# Patient Record
Sex: FEMALE | Race: WHITE | NOT HISPANIC OR LATINO | Employment: OTHER | URBAN - METROPOLITAN AREA
[De-identification: names, ages, dates, MRNs, and addresses within clinical notes are randomized per-mention and may not be internally consistent; named-entity substitution may affect disease eponyms.]

---

## 2017-01-06 ENCOUNTER — IMPORTED ENCOUNTER (OUTPATIENT)
Dept: URBAN - METROPOLITAN AREA CLINIC 43 | Facility: CLINIC | Age: 77
End: 2017-01-06

## 2017-01-06 PROBLEM — H40.51X3: Noted: 2017-01-06

## 2017-01-27 ENCOUNTER — IMPORTED ENCOUNTER (OUTPATIENT)
Dept: URBAN - METROPOLITAN AREA CLINIC 43 | Facility: CLINIC | Age: 77
End: 2017-01-27

## 2017-03-22 ENCOUNTER — IMPORTED ENCOUNTER (OUTPATIENT)
Dept: URBAN - METROPOLITAN AREA CLINIC 43 | Facility: CLINIC | Age: 77
End: 2017-03-22

## 2017-03-22 PROBLEM — H40.51X3: Noted: 2017-03-22

## 2017-04-12 ENCOUNTER — IMPORTED ENCOUNTER (OUTPATIENT)
Dept: URBAN - METROPOLITAN AREA CLINIC 43 | Facility: CLINIC | Age: 77
End: 2017-04-12

## 2017-04-12 PROBLEM — H40.51X3: Noted: 2017-04-12

## 2017-05-24 ENCOUNTER — IMPORTED ENCOUNTER (OUTPATIENT)
Dept: URBAN - METROPOLITAN AREA CLINIC 43 | Facility: CLINIC | Age: 77
End: 2017-05-24

## 2017-05-24 PROBLEM — H40.51X3: Noted: 2017-05-24

## 2017-11-27 NOTE — PATIENT DISCUSSION
MYOPIA (progressive high) OU__: PRESCRIBED GLASSES AND OR CONTACTS. DISCUSSED COULD GET A RX FOR DISTANCE THAT WOULD GO OVER MONO VISION CONTACTS. FOLLOW-UP AS SCHEDULED.

## 2018-01-08 NOTE — PATIENT DISCUSSION
Dermatitis Counseling: I have talked with the patient about avoiding eyelid rubbing as this causes and exacerbates this condition. Patient advised to call if symptoms worsen and keep follow-up as scheduled.

## 2018-01-18 NOTE — PATIENT DISCUSSION
DERMATITIS OU:  PRESCRIBED TOBRADEX GRANT TO LIDS BID OU FOR ONE WEEK QHS OU FOR ONE WEEK THEN DISCONTINUE. APPLY TO AFFECTED AREA TWICE DAILY.

## 2018-02-02 ENCOUNTER — IMPORTED ENCOUNTER (OUTPATIENT)
Dept: URBAN - METROPOLITAN AREA CLINIC 43 | Facility: CLINIC | Age: 78
End: 2018-02-02

## 2018-02-02 PROBLEM — H57.11: Noted: 2018-02-02

## 2018-02-02 PROBLEM — H40.51X3: Noted: 2018-02-02

## 2018-02-09 NOTE — PATIENT DISCUSSION
PT HAS HAD MULTIPLE BOUTS OF CONTACT DERMATITIS. WE HAVE USED TOBRADEX GRANT TO AFFECTED AREA WITH SUCCESS. 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

## 2018-02-09 NOTE — PATIENT DISCUSSION
New Prescription: erythromycin (erythromycin): ointment: 5 mg/gram (0.5 %) a small amount twice a day on eyelid 02-

## 2018-04-30 ENCOUNTER — IMPORTED ENCOUNTER (OUTPATIENT)
Dept: URBAN - METROPOLITAN AREA CLINIC 43 | Facility: CLINIC | Age: 78
End: 2018-04-30

## 2018-04-30 PROBLEM — H40.51X3: Noted: 2018-04-30

## 2018-04-30 PROBLEM — H57.11: Noted: 2018-04-30

## 2018-06-04 ENCOUNTER — IMPORTED ENCOUNTER (OUTPATIENT)
Dept: URBAN - METROPOLITAN AREA CLINIC 43 | Facility: CLINIC | Age: 78
End: 2018-06-04

## 2018-06-19 ENCOUNTER — IMPORTED ENCOUNTER (OUTPATIENT)
Dept: URBAN - METROPOLITAN AREA CLINIC 43 | Facility: CLINIC | Age: 78
End: 2018-06-19

## 2018-06-19 PROBLEM — H16.011: Noted: 2018-06-19

## 2018-11-05 ENCOUNTER — IMPORTED ENCOUNTER (OUTPATIENT)
Dept: URBAN - METROPOLITAN AREA CLINIC 43 | Facility: CLINIC | Age: 78
End: 2018-11-05

## 2018-12-12 ENCOUNTER — IMPORTED ENCOUNTER (OUTPATIENT)
Dept: URBAN - METROPOLITAN AREA CLINIC 43 | Facility: CLINIC | Age: 78
End: 2018-12-12

## 2019-01-25 ENCOUNTER — IMPORTED ENCOUNTER (OUTPATIENT)
Dept: URBAN - METROPOLITAN AREA CLINIC 43 | Facility: CLINIC | Age: 79
End: 2019-01-25

## 2019-05-02 ENCOUNTER — IMPORTED ENCOUNTER (OUTPATIENT)
Dept: URBAN - METROPOLITAN AREA CLINIC 43 | Facility: CLINIC | Age: 79
End: 2019-05-02

## 2019-06-24 ENCOUNTER — IMPORTED ENCOUNTER (OUTPATIENT)
Dept: URBAN - METROPOLITAN AREA CLINIC 43 | Facility: CLINIC | Age: 79
End: 2019-06-24

## 2019-07-03 ENCOUNTER — IMPORTED ENCOUNTER (OUTPATIENT)
Dept: URBAN - METROPOLITAN AREA CLINIC 43 | Facility: CLINIC | Age: 79
End: 2019-07-03

## 2019-07-03 PROBLEM — H35.362: Noted: 2019-07-03

## 2019-07-03 PROBLEM — H40.51X3: Noted: 2019-07-03

## 2019-07-03 PROBLEM — H54.42A4: Noted: 2019-07-03

## 2019-07-03 PROBLEM — H43.812: Noted: 2019-07-03

## 2019-09-16 ENCOUNTER — IMPORTED ENCOUNTER (OUTPATIENT)
Dept: URBAN - METROPOLITAN AREA CLINIC 43 | Facility: CLINIC | Age: 79
End: 2019-09-16

## 2019-09-16 ENCOUNTER — PREPPED CHART (OUTPATIENT)
Dept: URBAN - METROPOLITAN AREA CLINIC 32 | Facility: CLINIC | Age: 79
End: 2019-09-16

## 2019-09-16 PROBLEM — H54.42A4: Noted: 2019-09-16

## 2019-09-16 PROBLEM — H57.11: Noted: 2019-09-16

## 2019-09-16 PROBLEM — H44.521: Noted: 2019-09-16

## 2019-09-20 ENCOUNTER — IMPORTED ENCOUNTER (OUTPATIENT)
Dept: URBAN - METROPOLITAN AREA CLINIC 43 | Facility: CLINIC | Age: 79
End: 2019-09-20

## 2019-11-04 ENCOUNTER — IMPORTED ENCOUNTER (OUTPATIENT)
Dept: URBAN - METROPOLITAN AREA CLINIC 43 | Facility: CLINIC | Age: 79
End: 2019-11-04

## 2019-11-04 NOTE — PATIENT DISCUSSION
Continue: erythromycin (erythromycin): ointment: 5 mg/gram (0.5 %) a small amount twice a day on eyelid 02-

## 2020-01-13 NOTE — PATIENT DISCUSSION
New Prescription: Lotemax (loteprednol etabonate): ointment: 0.5% a small amount at bedtime into affected eye 01-

## 2020-01-13 NOTE — PATIENT DISCUSSION
Stopped Today: Lotemax SM (loteprednol etabonate): drops,gel: 0.38% 1 drop three times a day into affected eye 12-

## 2020-01-29 NOTE — PATIENT DISCUSSION
Continue: Lotemax (loteprednol etabonate): ointment: 0.5% a small amount at bedtime into affected eye 01-

## 2020-04-19 ASSESSMENT — TONOMETRY
OD_IOP_MMHG: 10.0
OS_IOP_MMHG: 11.0
OS_IOP_MMHG: 10.0
OS_IOP_MMHG: 11.0
OS_IOP_MMHG: 12.0
OD_IOP_MMHG: 23.0
OS_IOP_MMHG: 12.0
OD_IOP_MMHG: 21.0
OS_IOP_MMHG: 12.0
OD_IOP_MMHG: 14.0
OS_IOP_MMHG: 12.0
OD_IOP_MMHG: 22.0
OD_IOP_MMHG: 8.0
OD_IOP_MMHG: 12.0
OD_IOP_MMHG: 14.0
OS_IOP_MMHG: 11.0
OD_IOP_MMHG: 19.0
OS_IOP_MMHG: 11.0

## 2020-04-19 ASSESSMENT — VISUAL ACUITY
OS_SC: 20/20 -2
OS_CC: J1+
OS_SC: 20/20
OS_OTHER: 20/80.
OS_SC: 20/20 -2
OD_SC: 20/20 -2
OS_SC: 20/20 -2
OS_SC: 20/25
OS_SC: 20/20
OS_CC: J1+
OS_SC: J10
OD_SC: 20/20
OS_SC: 20/20
OD_CC: J1+
OS_SC: 20/20

## 2020-04-19 ASSESSMENT — KERATOMETRY
OS_AXISANGLE_DEGREES: 165
OS_AXISANGLE2_DEGREES: 90
OS_AXISANGLE2_DEGREES: 75
OS_K1POWER_DIOPTERS: 44.75
OS_K1POWER_DIOPTERS: 45.5
OS_K2POWER_DIOPTERS: 45
OS_AXISANGLE_DEGREES: 180
OS_K2POWER_DIOPTERS: 44.75

## 2020-07-13 ENCOUNTER — ESTABLISHED COMPREHENSIVE EXAM (OUTPATIENT)
Dept: URBAN - METROPOLITAN AREA CLINIC 32 | Facility: CLINIC | Age: 80
End: 2020-07-13

## 2020-07-13 DIAGNOSIS — H40.9: ICD-10-CM

## 2020-07-13 PROCEDURE — 92133 CPTRZD OPH DX IMG PST SGM ON: CPT

## 2020-07-13 PROCEDURE — 92014 COMPRE OPH EXAM EST PT 1/>: CPT

## 2020-07-13 ASSESSMENT — TONOMETRY
OS_IOP_MMHG: 10
OD_IOP_MMHG: 16

## 2020-07-13 ASSESSMENT — VISUAL ACUITY: OS_SC: 20/20

## 2020-08-18 NOTE — PATIENT DISCUSSION
New Prescription: Lotemax SM (loteprednol etabonate): drops,gel: 0.38% 1 drop three times a day into both eyes 08-

## 2020-08-18 NOTE — PATIENT DISCUSSION
Stopped Today: Lotemax (loteprednol etabonate): ointment: 0.5% a small amount at bedtime into affected eye 01-

## 2020-09-01 NOTE — PATIENT DISCUSSION
Continue: Lotemax SM (loteprednol etabonate): drops,gel: 0.38% 1 drop three times a day into both eyes 08-

## 2020-09-01 NOTE — PATIENT DISCUSSION
DRY EYE WITH INFLAMMATION:  PRESERVATIVE FREE ARTIFICIAL TEARS Q2 HOURS, LID HYGIENE. PRESCRIBED LOTEMAX QID. CONTINUE RESTASIS  AND WILL CONSIDER PUNCTAL PLUGS AND/OR LIPIFLOW AT FOLLOW. RETURN SOONER IF SYMPTOMS WORSEN.

## 2020-09-15 NOTE — PATIENT DISCUSSION
Stopped Today: erythromycin (erythromycin): ointment: 5 mg/gram (0.5 %) a small amount twice a day on eyelid 02-

## 2020-09-15 NOTE — PATIENT DISCUSSION
New Prescription: Soraya Arechiga (lifitegrast): dropperette: 5% 1 drop twice a day into both eyes 09-

## 2020-09-15 NOTE — PATIENT DISCUSSION
DRY EYE WITH INFLAMMATION: PRESERVATIVE FREE ARTIFICIAL TEARS Q2 HOURS, LID HYGIENE. PRESCRIBED LOTEMAX BID. CONTINUE RESTASIS AND WILL CONSIDER PUNCTAL PLUGS AND/OR LIPIFLOW AT FOLLOW. RETURN SOONER IF SYMPTOMS WORSEN.

## 2020-09-30 NOTE — PATIENT DISCUSSION
CONTINUE REGIMEN WITH NO CL FOR ONE MORE WEEK. THEN, OK TO RE-INTRODUCE CL BUT CONTINUE REGIMEN OF GTTS. INSTRUCTED TO GRADUALLY BUILD UP WEAR TIME.

## 2020-09-30 NOTE — PATIENT DISCUSSION
New Prescription: Lotemax SM (loteprednol etabonate): drops,gel: 0.38% 1 drop twice a day into both eyes 09-

## 2020-09-30 NOTE — PATIENT DISCUSSION
Stopped Today: Lotemax SM (loteprednol etabonate): drops,gel: 0.38% 1 drop three times a day into both eyes 08-

## 2020-09-30 NOTE — PATIENT DISCUSSION
DRY EYE WITH INFLAMMATION:  PRESERVATIVE FREE ARTIFICIAL TEARS Q2 HOURS, LID HYGIENE. CONTINUE  LOTEMAX BID AND XIIDRA BID. CONSIDER PUNCTAL PLUGS AND/OR LIPIFLOW AT FOLLOW. RETURN SOONER IF SYMPTOMS WORSEN.

## 2020-10-20 NOTE — PATIENT DISCUSSION
Continue: Lotemax SM (loteprednol etabonate): drops,gel: 0.38% 1 drop twice a day into both eyes 09-

## 2020-11-03 NOTE — PATIENT DISCUSSION
CATARACTS, OU - NOT VISUALLY SIGNIFICANT. DISP MONO VISION CL TRIALS TODAY. PATIENT DOES NOT LIKE PROCLEAR BRAND FIT BUT LIKES NEW POWER CHANGE AND VISION. WILL TRY SALAMANCA AT HOME AND RTO IN 2 WEEKS FOR CL CHECK.

## 2021-06-30 ENCOUNTER — EST. PATIENT EMERGENCY (OUTPATIENT)
Dept: URBAN - METROPOLITAN AREA CLINIC 32 | Facility: CLINIC | Age: 81
End: 2021-06-30

## 2021-06-30 DIAGNOSIS — H44.521: ICD-10-CM

## 2021-06-30 DIAGNOSIS — H57.11: ICD-10-CM

## 2021-06-30 DIAGNOSIS — H04.123: ICD-10-CM

## 2021-06-30 PROCEDURE — 92012 INTRM OPH EXAM EST PATIENT: CPT

## 2021-06-30 ASSESSMENT — TONOMETRY
OS_IOP_MMHG: 12
OD_IOP_MMHG: 26

## 2021-06-30 ASSESSMENT — VISUAL ACUITY: OS_SC: 20/20

## 2021-08-04 ENCOUNTER — VISUAL FIELD FOLLOW-UP (OUTPATIENT)
Dept: URBAN - METROPOLITAN AREA CLINIC 32 | Facility: CLINIC | Age: 81
End: 2021-08-04

## 2021-08-04 DIAGNOSIS — H04.123: ICD-10-CM

## 2021-08-04 DIAGNOSIS — H40.89: ICD-10-CM

## 2021-08-04 DIAGNOSIS — H35.3121: ICD-10-CM

## 2021-08-04 DIAGNOSIS — Z96.1: ICD-10-CM

## 2021-08-04 PROCEDURE — 92134 CPTRZ OPH DX IMG PST SGM RTA: CPT

## 2021-08-04 PROCEDURE — 92015 DETERMINE REFRACTIVE STATE: CPT

## 2021-08-04 PROCEDURE — 92083 EXTENDED VISUAL FIELD XM: CPT

## 2021-08-04 PROCEDURE — 92014 COMPRE OPH EXAM EST PT 1/>: CPT

## 2021-08-04 ASSESSMENT — KERATOMETRY
OS_K1POWER_DIOPTERS: 45.00
OS_AXISANGLE_DEGREES: 90
OS_K2POWER_DIOPTERS: 44.75
OS_AXISANGLE2_DEGREES: 180

## 2021-08-04 ASSESSMENT — VISUAL ACUITY
OS_SC: J10
OS_SC: 20/25+2
OS_CC: J1
OS_GLARE: 20/400

## 2021-08-04 ASSESSMENT — TONOMETRY
OS_IOP_MMHG: 10
OD_IOP_MMHG: 24

## 2022-04-11 NOTE — PATIENT DISCUSSION
Patient is symptomatic. Refer to retina specialist, Dr. Margi Sow, for further evaluation and treatment management.

## 2022-05-18 NOTE — PATIENT DISCUSSION
Patient is symptomatic. Refer to retina specialist, Dr. Nabeel Cee, for further evaluation and treatment management.

## 2022-07-27 ENCOUNTER — COMPREHENSIVE EXAM (OUTPATIENT)
Dept: URBAN - METROPOLITAN AREA CLINIC 32 | Facility: CLINIC | Age: 82
End: 2022-07-27

## 2022-07-27 DIAGNOSIS — H35.362: ICD-10-CM

## 2022-07-27 DIAGNOSIS — H35.3121: ICD-10-CM

## 2022-07-27 DIAGNOSIS — H40.9: ICD-10-CM

## 2022-07-27 DIAGNOSIS — H04.123: ICD-10-CM

## 2022-07-27 PROCEDURE — 92134 CPTRZ OPH DX IMG PST SGM RTA: CPT

## 2022-07-27 PROCEDURE — 92014 COMPRE OPH EXAM EST PT 1/>: CPT

## 2022-07-27 PROCEDURE — 92015 DETERMINE REFRACTIVE STATE: CPT

## 2022-07-27 ASSESSMENT — TONOMETRY
OD_IOP_MMHG: 27
OS_IOP_MMHG: 10

## 2022-07-27 ASSESSMENT — VISUAL ACUITY
OS_SC: 20/30-1
OS_SC: J1+

## 2022-07-27 ASSESSMENT — KERATOMETRY
OS_AXISANGLE2_DEGREES: 180
OS_K1POWER_DIOPTERS: 45.00
OS_AXISANGLE_DEGREES: 90
OS_K2POWER_DIOPTERS: 44.75
OS_AXISANGLE2_DEGREES: 125
OS_AXISANGLE_DEGREES: 35

## 2022-11-21 NOTE — PATIENT DISCUSSION
Patient is symptomatic. Refer to retina specialist, Dr. Lorena Gasca, for further evaluation and treatment management.

## 2022-12-16 NOTE — PATIENT DISCUSSION
Patient is symptomatic. Refer to retina specialist, Dr. Mark Malhotra, for further evaluation and treatment management.

## 2023-03-16 NOTE — PATIENT DISCUSSION
MODERATE DRY EYES: PRESCRIBED OTC DISAPPEARING PRESERVATIVE FREE TEARS OR PRESERVATIVE FREE ARTIFICIAL TEARS UP TO BID-QID, OU AND THE DAILY INTAKE OF OMEGA-3 DHA/EPA FATTY ACIDS TO HELP RELIEVE SYMPTOMS. ADD NIGHTLY LUBRICATING OINTMENT OR GEL. WILL CONSIDER RESTASIS OR XIIDRA OR PUNCTAL PLUGS AND/OR LIPIFLOW TREATMENT NEXT VISIT IF NOT RESPONSIVE OR IF SYMPTOMS PERSIST.  RETURN FOR FOLLOW-UP AS SCHEDULED OR SOONER IF SYMPTOMS WORSEN axillary

## 2023-07-10 ENCOUNTER — EMERGENCY VISIT (OUTPATIENT)
Dept: URBAN - METROPOLITAN AREA CLINIC 32 | Facility: CLINIC | Age: 83
End: 2023-07-10

## 2023-07-10 DIAGNOSIS — H40.9: ICD-10-CM

## 2023-07-10 DIAGNOSIS — H44.521: ICD-10-CM

## 2023-07-10 DIAGNOSIS — H16.421: ICD-10-CM

## 2023-07-10 PROCEDURE — 99214 OFFICE O/P EST MOD 30 MIN: CPT

## 2023-07-10 RX ORDER — CYCLOPENTOLATE HYDROCHLORIDE 10 MG/ML: 1 SOLUTION/ DROPS OPHTHALMIC

## 2023-07-10 ASSESSMENT — KERATOMETRY
OS_AXISANGLE2_DEGREES: 125
OS_AXISANGLE_DEGREES: 35
OS_K2POWER_DIOPTERS: 44.75
OS_K1POWER_DIOPTERS: 45.00

## 2023-07-10 ASSESSMENT — TONOMETRY
OS_IOP_MMHG: 10
OD_IOP_MMHG: 5

## 2023-07-10 ASSESSMENT — VISUAL ACUITY: OS_SC: 20/25

## 2024-02-29 ENCOUNTER — EMERGENCY VISIT (OUTPATIENT)
Dept: URBAN - METROPOLITAN AREA CLINIC 32 | Facility: CLINIC | Age: 84
End: 2024-02-29

## 2024-02-29 DIAGNOSIS — H40.9: ICD-10-CM

## 2024-02-29 DIAGNOSIS — H16.421: ICD-10-CM

## 2024-02-29 DIAGNOSIS — H44.521: ICD-10-CM

## 2024-02-29 PROCEDURE — 99213 OFFICE O/P EST LOW 20 MIN: CPT

## 2024-02-29 ASSESSMENT — TONOMETRY
OS_IOP_MMHG: 9
OD_IOP_MMHG: 15

## 2024-02-29 ASSESSMENT — VISUAL ACUITY: OS_SC: 20/25

## 2025-06-18 ENCOUNTER — COMPREHENSIVE EXAM (OUTPATIENT)
Age: 85
End: 2025-06-18

## 2025-06-18 DIAGNOSIS — H35.3122: ICD-10-CM

## 2025-06-18 DIAGNOSIS — H04.123: ICD-10-CM

## 2025-06-18 DIAGNOSIS — H44.521: ICD-10-CM

## 2025-06-18 DIAGNOSIS — H16.421: ICD-10-CM

## 2025-06-18 DIAGNOSIS — H40.9: ICD-10-CM

## 2025-06-18 PROCEDURE — 99214 OFFICE O/P EST MOD 30 MIN: CPT

## 2025-06-18 PROCEDURE — 92134 CPTRZ OPH DX IMG PST SGM RTA: CPT

## 2025-06-18 PROCEDURE — 92015 DETERMINE REFRACTIVE STATE: CPT
